# Patient Record
(demographics unavailable — no encounter records)

---

## 2025-03-20 NOTE — REASON FOR VISIT
[Follow-Up: _____] : a [unfilled] follow-up visit [Language Line ] : provided by [employer] [facility]   [Interpreters_IDNumber] : [id number] [Interpreters_FullName] : Ivan [[email or web address]] : [place]

## 2025-03-20 NOTE — HISTORY OF PRESENT ILLNESS
[FreeTextEntry1] : 3/20/2025:  Had been feeling better overall, though currently w/ worsening of her low back pain - worst laterally.  The pain is worse w/activity or when lying on her back.  No radiation.  No F/C, no unintentional weight loss, no night sweats, no saddle anesthesia, no bowel/bladder incontinence.  Otherwise feeling fine since last visit.  No other complaints. 9/5/2024:  Pt c/o focal farhat in her lower thoracic / upper lumbar spine - the pain is intermittent, typically worse with activity and better at rest.  No radiation.  No F/C, no unintentional weight loss, no night sweats, no saddle anesthesia, no bowel/bladder incontinence.  Otherwise feeling fine since last visit.  No other complaints. 3/5/2024:  Feeling fine since last visit.  Low back pain has virtually resolved.  No other complaints. Of note, pt reports that she has not been taking Tymlos, as she thought it was only supposed to be for 1 month. [Anorexia] : no anorexia [Weight Loss] : no weight loss [Malaise] : no malaise [Fever] : no fever [Chills] : no chills [Fatigue] : no fatigue [Malar Facial Rash] : no malar facial rash [Skin Lesions] : no lesions [Skin Nodules] : no skin nodules [Oral Ulcers] : no oral ulcers [Cough] : no cough [Dry Mouth] : no dry mouth [Dysphonia] : no dysphonia [Dysphagia] : no dysphagia [Shortness of Breath] : no shortness of breath [Chest Pain] : no chest pain [Arthralgias] : no arthralgias [Joint Swelling] : no joint swelling [Joint Warmth] : no joint warmth [Joint Deformity] : no joint deformity [Decreased ROM] : no decreased range of motion [Morning Stiffness] : no morning stiffness [Falls] : no falls [Difficulty Standing] : no difficulty standing [Difficulty Walking] : no difficulty walking [Dyspnea] : no dyspnea [Myalgias] : no myalgias [Muscle Weakness] : no muscle weakness [Muscle Spasms] : no muscle spasms [Muscle Cramping] : no muscle cramping [Visual Changes] : no visual changes [Eye Pain] : no eye pain [Eye Redness] : no eye redness [Dry Eyes] : no dry eyes

## 2025-03-20 NOTE — PHYSICAL EXAM
[General Appearance - Alert] : alert [General Appearance - In No Acute Distress] : in no acute distress [Sclera] : the sclera and conjunctiva were normal [Outer Ear] : the ears and nose were normal in appearance [Oropharynx] : the oropharynx was normal [Neck Appearance] : the appearance of the neck was normal [Neck Cervical Mass (___cm)] : no neck mass was observed [Jugular Venous Distention Increased] : there was no jugular-venous distention [Thyroid Diffuse Enlargement] : the thyroid was not enlarged [Thyroid Nodule] : there were no palpable thyroid nodules [Auscultation Breath Sounds / Voice Sounds] : lungs were clear to auscultation bilaterally [Heart Rate And Rhythm] : heart rate was normal and rhythm regular [Heart Sounds] : normal S1 and S2 [Heart Sounds Gallop] : no gallops [Murmurs] : no murmurs [Heart Sounds Pericardial Friction Rub] : no pericardial rub [Edema] : there was no peripheral edema [Bowel Sounds] : normal bowel sounds [Abdomen Soft] : soft [Abdomen Tenderness] : non-tender [Abdomen Mass (___ Cm)] : no abdominal mass palpated [Cervical Lymph Nodes Enlarged Posterior Bilaterally] : posterior cervical [Cervical Lymph Nodes Enlarged Anterior Bilaterally] : anterior cervical [Supraclavicular Lymph Nodes Enlarged Bilaterally] : supraclavicular [Skin Color & Pigmentation] : normal skin color and pigmentation [Skin Turgor] : normal skin turgor [] : no rash [No Focal Deficits] : no focal deficits [Oriented To Time, Place, And Person] : oriented to person, place, and time [Impaired Insight] : insight and judgment were intact [Affect] : the affect was normal [FreeTextEntry1] : No synovitis, (+)focal tenderness over lower thoracic / upper lumbar spine;  full ROM in all joints

## 2025-03-20 NOTE — ASSESSMENT
[FreeTextEntry1] : 62 year old female with severe osteoporosis, including multiple thoracic and lumbar compression fractures.  Had been on Tymlos, but she reports that she has been off it for the past 6 months.  LBP has virtually resolved.  - Restart Tymlos.  Will plan to treat through 10/26.  - Cont calcium / vit D supplementation  - Reiterated importance of weight bearing exercise.  - x-rays of pelvis  - Cont back exercises.  - Rx naproxen 500mg BID prn.  Cont Tylenol prn  - OTC topical analgesics  - Heating pads  - Back brace

## 2025-07-17 NOTE — REASON FOR VISIT
[Follow-Up: _____] : a [unfilled] follow-up visit [Language Line ] : provided by [employer] [facility]   [Interpreters_IDNumber] : [id number] [Interpreters_FullName] : Jose [[email or web address]] : [place]

## 2025-07-17 NOTE — HISTORY OF PRESENT ILLNESS
[FreeTextEntry1] : 7/17/2025:  Feeling much better.  She now c/o a "bump" protruding from her lower back, which is painful when lying on her back.  Otherwise, there's no pain the rest of the time. No pain in the rest of her joints.  No other complaints. 3/20/2025:  Had been feeling better overall, though currently w/ worsening of her low back pain - worst laterally.  The pain is worse w/activity or when lying on her back.  No radiation.  No F/C, no unintentional weight loss, no night sweats, no saddle anesthesia, no bowel/bladder incontinence.  Otherwise feeling fine since last visit.  No other complaints. 9/5/2024:  Pt c/o focal farhat in her lower thoracic / upper lumbar spine - the pain is intermittent, typically worse with activity and better at rest.  No radiation.  No F/C, no unintentional weight loss, no night sweats, no saddle anesthesia, no bowel/bladder incontinence.  Otherwise feeling fine since last visit.  No other complaints. 3/5/2024:  Feeling fine since last visit.  Low back pain has virtually resolved.  No other complaints. Of note, pt reports that she has not been taking Tymlos, as she thought it was only supposed to be for 1 month. [Anorexia] : no anorexia [Weight Loss] : no weight loss [Malaise] : no malaise [Fever] : no fever [Chills] : no chills [Fatigue] : no fatigue [Malar Facial Rash] : no malar facial rash [Skin Lesions] : no lesions [Skin Nodules] : no skin nodules [Oral Ulcers] : no oral ulcers [Cough] : no cough [Dry Mouth] : no dry mouth [Dysphonia] : no dysphonia [Dysphagia] : no dysphagia [Shortness of Breath] : no shortness of breath [Chest Pain] : no chest pain [Arthralgias] : no arthralgias [Joint Swelling] : no joint swelling [Joint Warmth] : no joint warmth [Joint Deformity] : no joint deformity [Decreased ROM] : no decreased range of motion [Morning Stiffness] : no morning stiffness [Falls] : no falls [Difficulty Standing] : no difficulty standing [Difficulty Walking] : no difficulty walking [Dyspnea] : no dyspnea [Myalgias] : no myalgias [Muscle Weakness] : no muscle weakness [Muscle Spasms] : no muscle spasms [Muscle Cramping] : no muscle cramping [Visual Changes] : no visual changes [Eye Pain] : no eye pain [Eye Redness] : no eye redness [Dry Eyes] : no dry eyes

## 2025-07-17 NOTE — ASSESSMENT
[FreeTextEntry1] : 63 year old female with severe osteoporosis, including multiple thoracic and lumbar compression fractures.  Had been on Tymlos, but she reports that she has been off it for the past 6 months.  LBP had improved but now worse again.  - Restart Tymlos.  Will plan to treat through 10/26.  - Cont calcium / vit D supplementation  - Reiterated importance of weight bearing exercise.  - Cont back exercises.  - Cont naproxen 500mg BID prn +/- Tylenol prn  - OTC topical analgesics prn  - Heating pads prn  - Back brace